# Patient Record
Sex: MALE | Race: WHITE | Employment: STUDENT | URBAN - METROPOLITAN AREA
[De-identification: names, ages, dates, MRNs, and addresses within clinical notes are randomized per-mention and may not be internally consistent; named-entity substitution may affect disease eponyms.]

---

## 2018-12-28 ENCOUNTER — OFFICE VISIT (OUTPATIENT)
Dept: NEUROLOGY | Age: 18
End: 2018-12-28

## 2018-12-28 VITALS
RESPIRATION RATE: 18 BRPM | DIASTOLIC BLOOD PRESSURE: 80 MMHG | HEART RATE: 81 BPM | WEIGHT: 200 LBS | BODY MASS INDEX: 23.62 KG/M2 | HEIGHT: 77 IN | OXYGEN SATURATION: 98 % | SYSTOLIC BLOOD PRESSURE: 124 MMHG

## 2018-12-28 DIAGNOSIS — R20.2 NUMBNESS AND TINGLING OF LEFT SIDE OF FACE: ICD-10-CM

## 2018-12-28 DIAGNOSIS — R51.9 NEW ONSET HEADACHE: ICD-10-CM

## 2018-12-28 DIAGNOSIS — R20.0 NUMBNESS AND TINGLING OF LEFT SIDE OF FACE: ICD-10-CM

## 2018-12-28 DIAGNOSIS — R51.9 NEW ONSET HEADACHE: Primary | ICD-10-CM

## 2018-12-28 NOTE — PROGRESS NOTES
Chief Complaint Patient presents with  
 Headache  
  for two weeks constant headache/ hurt to touch/ then experienced numbness/ left sided Visit Vitals /80 Pulse 81 Resp 18 Ht 6' 5\" (1.956 m) Wt 90.7 kg (200 lb) SpO2 98% BMI 23.72 kg/m² Doreen Dooley

## 2018-12-28 NOTE — PROGRESS NOTES
Laurence [id number] [facility] Vladimir Manning 55  
[address] [facility] 24 [phone] Fax Referring: Dr. Robi Guerrero Chief Complaint Patient presents with  
 Headache  
  for two weeks constant headache/ hurt to touch/ then experienced numbness/ left sided 78-year-old right-handed gentleman who presents today coming by his father for evaluation of what he calls headaches then leading to numbness for 2 weeks after. He is not a headache gentleman. He has never had bad headaches before. This was a new issue. He says that he had the onset of an intense left-sided headache with associated nausea. Headache was located about the front of the ear in the left temporal and orbital region. No visual change or disturbance. No injury. No changes in meds as he takes none. No inciting factor. He had not been ill. There was no fever or chills. There is no rash. There is no chest pain or shortness of breath associated. The headache persisted for several days and then he began to get tenderness about the same region of the face. It was not in a trigeminal mediated type pattern and that there was no distinct V1 or V2 type distribution and there was no sharp shooting pain rather if he touched his face it was tender to touch. This then became numb and he had numbness of the face for approximately 2 weeks. Sensation has returned to normal at this point. He has a cochlear implant on the left side. He has had no testing. His mother does get sick headaches and apparently will get these and have to lay down with a heating pad. Father will get headaches that he attributes to sinus. The patient had no focal deficit. No numbness or tingling in the extremities or trunk. No radicular type pain. History reviewed. No pertinent past medical history. He had a spontaneous pneumothorax April 2018 History reviewed. No pertinent surgical history. He has a left-sided cochlear implant 2003 Current Outpatient Medications Medication Sig Dispense Refill  ibuprofen (MOTRIN) 600 mg tablet Take 1 tablet by mouth every six (6) hours as needed for Pain. 20 tablet 0  
 ondansetron (ZOFRAN ODT) 4 mg disintegrating tablet Take 1 tablet by mouth every eight (8) hours as needed for Nausea. 12 tablet 0  
 ondansetron hcl (ZOFRAN) 8 mg tablet Take 1 Tab by mouth every eight (8) hours as needed for Nausea. 12 Tab 0 No Known Allergies Social History Tobacco Use  Smoking status: Not on file Substance Use Topics  Alcohol use: Not on file  Drug use: Not on file History reviewed. No pertinent family history. Review of Systems Pertinent positives and negatives as noted with remainder of comprehensive review negative Examination Visit Vitals /80 Pulse 81 Resp 18 Ht 6' 5\" (1.956 m) Wt 90.7 kg (200 lb) SpO2 98% BMI 23.72 kg/m² Pleasant, well appearing. No icterus. He has a cochlear implant left side. Oropharynx clear. Supple neck without bruit. Heart regular. No murmur. No edema. Neurologically, he is awake, alert, oriented with normal speech, language, and cognition. Visual fields are full to direct confrontational testing. He has sharp disk margins bilaterally. Pupils react bilaterally. He has full versions without nystagmus. Face is symmetrical with symmetrical facial sensation. Tongue and palate are midline. Shoulder shrug is symmetrical. Hearing is intact to conversation. He has normal bulk and tone. There is no abnormal movement. There is no pronation or drift. He is able to generate full strength in the upper and lower extremities and all muscle groups to direct confrontational testing. Reflexes are symmetrical in the upper and lower extremities bilaterally. His toes are down going. There is no Claire. There is no finger flexor reflex bilaterally. Finger nose finger and rapid alternating movements are normal. He has no ataxia or past pointing. Sensory examination is intact to primary modalities and there is no lateralization of sensation. No Romberg is present. There is no extinction. He is able to ambulate without difficulty. He has a good stride. He is able to heal, toe, and tandem without difficulty. Impression/Plan 68-year-old gentleman with new onset head pain/headache with then facial pain and then numbness and certainly differential diagnosis includes new onset migraine with cutaneous allodynia however the duration is a bit concerning and also the fact that he had numbness after that discomfort which then persisted for 2 weeks. Consideration also would be for a demyelinating type process as well. Intracranial mass lesion causing secondary headache also a concern. This is also the side where he has his cochlear implant question if this could be involved as well although there was no change in his hearing etc.  Were unable to get an MRI due to the cochlear implant. We will get a CT scan of the head with and without contrast.  We will check a carotid Doppler looking for vascular anomaly. He will return at the completion of his testing. If the testing fails to demonstrate anything ominous then we will take a wait-and-see approach. Certainly we will address any abnormality found. Leeanna Lowery MD 
 
This note was created using voice recognition software. Despite editing, there may be syntax errors. This note will not be viewable in [address]

## 2018-12-28 NOTE — PROCEDURES
Carotid Doppler:     Date:  12/28/2018    Requesting Physician:  Baron Shannan MD     Indication:  Visual disturbance. B-mode imaging reveals no significant plaque throughout the carotid systems bilaterally. Doppler spectral analysis reveals no significant velocity shifts. Vertebral artery flow is antegrade bilaterally. Interpretation:     No significant plaque.  No significant stenosis.

## 2018-12-28 NOTE — PATIENT INSTRUCTIONS
PRESCRIPTION REFILL POLICY [facility] Statement to Patients April 1, 2014 In an effort to ensure the large volume of patient prescription refills is processed in the most efficient and expeditious manner, we are asking our patients to assist us by calling your Pharmacy for all prescription refills, this will include also your  Mail Order Pharmacy. The pharmacy will contact our office electronically to continue the refill process. Please do not wait until the last minute to call your pharmacy. We need at least 48 hours (2days) to fill prescriptions. We also encourage you to call your pharmacy before going to  your prescription to make sure it is ready. With regard to controlled substance prescription refill requests (narcotic refills) that need to be picked up at our office, we ask your cooperation by providing us with at least 72 hours (3days) notice that you will need a refill. We will not refill narcotic prescription refill requests after 4:00pm on any weekday, Monday through Thursday, or after 2:00pm on Fridays, or on the weekends. We encourage everyone to explore another way of getting your prescription refill request processed using [employer], our patient web portal through our electronic medical record system. [employer] is an efficient and effective way to communicate your medication request directly to the office and  downloadable as an josh on your smart phone . [employer] also features a review functionality that allows you to view your medication list as well as leave messages for your physician. Are you ready to get connected? If so please review the attatched instructions or speak to any of our staff to get you set up right away! Thank you so much for your cooperation. Should you have any questions please contact our Practice Administrator. The Physicians and Staff,  [facility] Patient Instruction Plan/ Result Policy If we have ordered testing for you, know that; \"NO NEWS IS GOOD NEWS! \" It is our policy that we know longer call patients with results, nor do we  give test results over the phone. We schedule follow up appointments so that your results can be discussed in person. This allows you to address any questions you have regarding the results. If something of concern is revealed on your test, we will contact you to discuss the matter and if needed schedule a sooner follow up appointment. Additionally, results may be found by using the My Chart feature and one of our patient service representatives at the  can give you instructions on how to access this feature to utilize our electronic medical record system. Thank you for your understanding.

## 2019-01-04 ENCOUNTER — HOSPITAL ENCOUNTER (OUTPATIENT)
Dept: CT IMAGING | Age: 19
Discharge: HOME | End: 2019-01-04
Attending: PSYCHIATRY & NEUROLOGY
Payer: COMMERCIAL

## 2019-01-04 DIAGNOSIS — R51.9 NEW ONSET HEADACHE: ICD-10-CM

## 2019-01-04 DIAGNOSIS — R20.0 NUMBNESS AND TINGLING OF LEFT SIDE OF FACE: ICD-10-CM

## 2019-01-04 DIAGNOSIS — R20.2 NUMBNESS AND TINGLING OF LEFT SIDE OF FACE: ICD-10-CM

## 2019-01-04 PROCEDURE — 70470 CT HEAD/BRAIN W/O & W/DYE: CPT

## 2019-01-04 PROCEDURE — 74011636320 HC RX REV CODE- 636/320: Performed by: PSYCHIATRY & NEUROLOGY

## 2019-01-04 RX ADMIN — IOPAMIDOL 100 ML: 612 INJECTION, SOLUTION INTRAVENOUS at 10:14

## 2019-01-25 ENCOUNTER — OFFICE VISIT (OUTPATIENT)
Dept: NEUROLOGY | Age: 19
End: 2019-01-25

## 2019-01-25 VITALS
HEIGHT: 77 IN | BODY MASS INDEX: 23.97 KG/M2 | DIASTOLIC BLOOD PRESSURE: 70 MMHG | SYSTOLIC BLOOD PRESSURE: 126 MMHG | WEIGHT: 203 LBS

## 2019-01-25 DIAGNOSIS — G43.909 MIGRAINE WITHOUT STATUS MIGRAINOSUS, NOT INTRACTABLE, UNSPECIFIED MIGRAINE TYPE: Primary | ICD-10-CM

## 2019-01-25 NOTE — PROGRESS NOTES
Za Peraza is a 19 y.o. male who presents with the following  Chief Complaint   Patient presents with    Migraine       HPI Patient comes in with mother for a follow up for CT, carotid doppler to evaluate new onset headaches. No symptoms since last visit. No complaints. Doing well. No Known Allergies    Current Outpatient Medications   Medication Sig    ibuprofen (MOTRIN) 600 mg tablet Take 1 tablet by mouth every six (6) hours as needed for Pain.  ondansetron hcl (ZOFRAN) 8 mg tablet Take 1 Tab by mouth every eight (8) hours as needed for Nausea. No current facility-administered medications for this visit. Social History     Tobacco Use   Smoking Status Never Smoker   Smokeless Tobacco Never Used       History reviewed. No pertinent past medical history. History reviewed. No pertinent surgical history. History reviewed. No pertinent family history. Social History     Socioeconomic History    Marital status: SINGLE     Spouse name: Not on file    Number of children: Not on file    Years of education: Not on file    Highest education level: Not on file   Tobacco Use    Smoking status: Never Smoker    Smokeless tobacco: Never Used       ROS    Remainder of comprehensive review is negative. Physical Exam :    Visit Vitals  /70   Ht 6' 5\" (1.956 m)   Wt 92.1 kg (203 lb)   BMI 24.07 kg/m²             No results found for this or any previous visit. No orders of the defined types were placed in this encounter. No diagnosis found. Follow-up Disposition: Not on File    CT discussed in full. Carotid doppler discussed in full with no other questions from patient or mother. They understand and he will track symptoms, headaches. Give some Cambia to try as rescue due to complicated migraine symptoms. Call if anything changes.          This note will not be viewable in [facility]